# Patient Record
Sex: MALE | Race: OTHER | HISPANIC OR LATINO | Employment: FULL TIME | URBAN - METROPOLITAN AREA
[De-identification: names, ages, dates, MRNs, and addresses within clinical notes are randomized per-mention and may not be internally consistent; named-entity substitution may affect disease eponyms.]

---

## 2022-04-08 ENCOUNTER — HOSPITAL ENCOUNTER (EMERGENCY)
Facility: HOSPITAL | Age: 32
Discharge: HOME | End: 2022-04-08
Attending: EMERGENCY MEDICINE

## 2022-04-08 VITALS
SYSTOLIC BLOOD PRESSURE: 143 MMHG | OXYGEN SATURATION: 100 % | DIASTOLIC BLOOD PRESSURE: 81 MMHG | HEART RATE: 84 BPM | TEMPERATURE: 98.6 F | RESPIRATION RATE: 17 BRPM

## 2022-04-08 DIAGNOSIS — K08.89 TOOTHACHE: Primary | ICD-10-CM

## 2022-04-08 PROCEDURE — 96372 THER/PROPH/DIAG INJ SC/IM: CPT

## 2022-04-08 PROCEDURE — 99284 EMERGENCY DEPT VISIT MOD MDM: CPT | Performed by: PHYSICIAN ASSISTANT

## 2022-04-08 PROCEDURE — 99282 EMERGENCY DEPT VISIT SF MDM: CPT

## 2022-04-08 RX ORDER — NAPROXEN 500 MG/1
500 TABLET ORAL 2 TIMES DAILY WITH MEALS
Qty: 30 TABLET | Refills: 0 | Status: SHIPPED | OUTPATIENT
Start: 2022-04-08

## 2022-04-08 RX ORDER — KETOROLAC TROMETHAMINE 30 MG/ML
15 INJECTION, SOLUTION INTRAMUSCULAR; INTRAVENOUS ONCE
Status: COMPLETED | OUTPATIENT
Start: 2022-04-08 | End: 2022-04-08

## 2022-04-08 RX ORDER — AMOXICILLIN 250 MG/1
500 CAPSULE ORAL ONCE
Status: COMPLETED | OUTPATIENT
Start: 2022-04-08 | End: 2022-04-08

## 2022-04-08 RX ORDER — LIDOCAINE HYDROCHLORIDE 20 MG/ML
10 SOLUTION OROPHARYNGEAL 4 TIMES DAILY PRN
Qty: 100 ML | Refills: 0 | Status: SHIPPED | OUTPATIENT
Start: 2022-04-08

## 2022-04-08 RX ORDER — AMOXICILLIN 500 MG/1
500 CAPSULE ORAL 3 TIMES DAILY
Qty: 9 CAPSULE | Refills: 0 | Status: SHIPPED | OUTPATIENT
Start: 2022-04-08 | End: 2022-04-11

## 2022-04-08 RX ORDER — LIDOCAINE HYDROCHLORIDE 20 MG/ML
15 SOLUTION OROPHARYNGEAL ONCE
Status: COMPLETED | OUTPATIENT
Start: 2022-04-08 | End: 2022-04-08

## 2022-04-08 RX ADMIN — KETOROLAC TROMETHAMINE 15 MG: 30 INJECTION, SOLUTION INTRAMUSCULAR at 19:34

## 2022-04-08 RX ADMIN — AMOXICILLIN 500 MG: 250 CAPSULE ORAL at 19:34

## 2022-04-08 RX ADMIN — LIDOCAINE HYDROCHLORIDE 15 ML: 20 SOLUTION ORAL; TOPICAL at 19:35

## 2022-04-08 NOTE — ED PROVIDER NOTES
History  Chief Complaint   Patient presents with    Dental Pain     left lower toothache     27 y/o M p/w L lower dental pain x 1 day  Patient has not taken anything for pain  He endorses facial swelling, ttp of the tooth, pain with chewing  Patient denies HA, LH, fevers, chills, SOB, sore throat, CP, difficulty swallowing, difficulty breathing  Patient does not follow with a regular dentist       History provided by:  Patient   used: No    Dental Pain  Location:  Lower  Lower teeth location:  17/LL 3rd molar and 18/LL 2nd molar  Quality:  Aching and constant  Severity:  Moderate  Onset quality:  Gradual  Duration:  1 day  Timing:  Constant  Progression:  Unchanged  Chronicity:  New  Relieved by:  None tried  Worsened by:  Touching  Ineffective treatments:  None tried  Associated symptoms: facial pain, facial swelling and gum swelling    Associated symptoms: no congestion, no difficulty swallowing, no drooling, no fever, no headaches, no neck pain, no neck swelling, no oral bleeding, no oral lesions and no trismus        None       History reviewed  No pertinent past medical history  History reviewed  No pertinent surgical history  History reviewed  No pertinent family history  I have reviewed and agree with the history as documented  E-Cigarette/Vaping     E-Cigarette/Vaping Substances     Social History     Tobacco Use    Smoking status: Never Smoker    Smokeless tobacco: Not on file   Substance Use Topics    Alcohol use: Never    Drug use: Never       Review of Systems   Constitutional: Negative for appetite change, chills, diaphoresis and fever  HENT: Positive for dental problem and facial swelling  Negative for congestion, drooling, mouth sores, rhinorrhea, sore throat and trouble swallowing  Eyes: Negative for pain, discharge and visual disturbance  Respiratory: Negative for cough, chest tightness, shortness of breath and stridor      Cardiovascular: Negative for chest pain and palpitations  Gastrointestinal: Negative for abdominal pain, constipation, diarrhea, nausea and vomiting  Genitourinary: Negative for dysuria, flank pain and hematuria  Musculoskeletal: Negative for back pain, neck pain and neck stiffness  Skin: Negative for pallor and rash  Neurological: Negative for dizziness, weakness, light-headedness, numbness and headaches  All other systems reviewed and are negative  Physical Exam  Physical Exam  Vitals reviewed  Constitutional:       General: He is not in acute distress  Appearance: Normal appearance  He is well-developed and well-groomed  He is not ill-appearing  HENT:      Head: Normocephalic and atraumatic  Jaw: No trismus  Comments: Mild swelling the the LL external jaw  Right Ear: External ear normal       Left Ear: External ear normal       Nose: Nose normal       Mouth/Throat:      Lips: Pink  Mouth: Mucous membranes are moist       Dentition: Dental tenderness and gingival swelling present  No dental caries or dental abscesses  Tongue: No lesions  Tongue does not deviate from midline  Palate: No mass and lesions  Pharynx: Oropharynx is clear  Uvula midline  No pharyngeal swelling, oropharyngeal exudate, posterior oropharyngeal erythema or uvula swelling  Tonsils: No tonsillar exudate or tonsillar abscesses  Comments: Eruption of molar noted b/l LL and RL  Mild gingival edema, no erythema, no abscess  No trismus  Eyes:      General: No scleral icterus  Conjunctiva/sclera: Conjunctivae normal    Cardiovascular:      Rate and Rhythm: Normal rate and regular rhythm  Pulmonary:      Effort: Pulmonary effort is normal       Breath sounds: No stridor  Abdominal:      General: There is no distension  Musculoskeletal:         General: No deformity  Normal range of motion  Cervical back: Normal range of motion  Skin:     Coloration: Skin is not jaundiced or pale        Findings: No lesion or rash  Neurological:      Mental Status: He is alert and oriented to person, place, and time  Psychiatric:         Mood and Affect: Mood normal          Behavior: Behavior normal  Behavior is cooperative  Vital Signs  ED Triage Vitals [04/08/22 1903]   Temperature Pulse Respirations Blood Pressure SpO2   98 6 °F (37 °C) 84 17 143/81 100 %      Temp src Heart Rate Source Patient Position - Orthostatic VS BP Location FiO2 (%)   -- -- -- -- --      Pain Score       10 - Worst Possible Pain           Vitals:    04/08/22 1903   BP: 143/81   Pulse: 84         Visual Acuity      ED Medications  Medications   Lidocaine Viscous HCl (XYLOCAINE) 2 % mucosal solution 15 mL (15 mL Swish & Spit Given 4/8/22 1935)   amoxicillin (AMOXIL) capsule 500 mg (500 mg Oral Given 4/8/22 1934)   ketorolac (TORADOL) injection 15 mg (15 mg Intramuscular Given 4/8/22 1934)       Diagnostic Studies  Results Reviewed     None                 No orders to display              Procedures  Procedures         ED Course           SBIRT 22yo+      Most Recent Value   SBIRT (24 yo +)    In order to provide better care to our patients, we are screening all of our patients for alcohol and drug use  Would it be okay to ask you these screening questions? No Filed at: 04/08/2022 1906                    MDM  Number of Diagnoses or Management Options  Toothache: new and does not require workup  Diagnosis management comments: Patient given Toradol, viscous lidocaine, amoxicillin here  Patient pain is improved at this time  Will d/c with amoxicillin, naprosyn and viscous lidocaine  Dispo:  Discharge home with follow-up to dental clinic  Patient was given contact information as well for dental clinic at this time  Continue with amoxicillin as prescribed  Take Naprosyn in use viscous lidocaine for pain  Patient understanding of return precautions  Patient stable at discharge      Prior to discharge, plan of care was discussed in detail with the patient at bedside  Patient was provided both verbal and written instructions  The patient verbalized understanding of the discharge instructions and warnings that would necessitate return to the ED  All questions were answered  Patient was comfortable with the plan of care and discharged to home  Patient stable at discharge  Risk of Complications, Morbidity, and/or Mortality  Presenting problems: low  Diagnostic procedures: low  Management options: low    Patient Progress  Patient progress: improved      Disposition  Final diagnoses:   Toothache     Time reflects when diagnosis was documented in both MDM as applicable and the Disposition within this note     Time User Action Codes Description Comment    4/8/2022  7:38 PM Rosales Lewis Add [Q16 44] Toothache       ED Disposition     ED Disposition Condition Date/Time Comment    Discharge Stable Fri Apr 8, 2022  7:38 PM Delta Boss discharge to home/self care  Follow-up Information     Follow up With Specialties Details Why [address]  Schedule an appointment as soon as possible for a visit today  [address] [facility]  [address] [place] [address]          Discharge Medication List as of 4/8/2022  7:46 PM      START taking these medications    Details   amoxicillin (AMOXIL) 500 mg capsule Take 1 capsule (500 mg total) by mouth 3 (three) times a day for 3 days, Starting Fri 4/8/2022, Until Mon 4/11/2022, Normal      Lidocaine Viscous HCl (XYLOCAINE) 2 % mucosal solution Swish and spit 10 mL 4 (four) times a day as needed for mouth pain or discomfort, Starting Fri 4/8/2022, Normal      naproxen (Naprosyn) 500 mg tablet Take 1 tablet (500 mg total) by mouth 2 (two) times a day with meals, Starting Fri 4/8/2022, Normal             No discharge procedures on file      PDMP Review     None          ED Provider  Electronically Signed by RAUL Stallings, RAUL  04/09/22 0503

## 2022-04-08 NOTE — DISCHARGE INSTRUCTIONS
Please follow up with the dental clinic for further evaluation  Take amoxicillin as prescribed to the pharmacy  Use viscous lidocaine as prescribed to the pharmacy for pain  Use naprosyn as prescribed to the pharmacy for dental pain  Can add tylenol as needed for pain